# Patient Record
Sex: FEMALE | URBAN - METROPOLITAN AREA
[De-identification: names, ages, dates, MRNs, and addresses within clinical notes are randomized per-mention and may not be internally consistent; named-entity substitution may affect disease eponyms.]

---

## 2022-09-21 ENCOUNTER — EMERGENCY (EMERGENCY)
Facility: HOSPITAL | Age: 52
LOS: 1 days | Discharge: HOME | End: 2022-09-21
Attending: EMERGENCY MEDICINE
Payer: COMMERCIAL

## 2022-09-21 VITALS
RESPIRATION RATE: 16 BRPM | HEART RATE: 81 BPM | HEIGHT: 65 IN | WEIGHT: 169.98 LBS | TEMPERATURE: 98 F | SYSTOLIC BLOOD PRESSURE: 142 MMHG | OXYGEN SATURATION: 97 % | DIASTOLIC BLOOD PRESSURE: 90 MMHG

## 2022-09-21 VITALS
HEART RATE: 85 BPM | OXYGEN SATURATION: 98 % | DIASTOLIC BLOOD PRESSURE: 87 MMHG | TEMPERATURE: 98 F | SYSTOLIC BLOOD PRESSURE: 133 MMHG | RESPIRATION RATE: 18 BRPM

## 2022-09-21 DIAGNOSIS — H72.90 UNSPECIFIED PERFORATION OF TYMPANIC MEMBRANE, UNSPECIFIED EAR: ICD-10-CM

## 2022-09-21 LAB
ALBUMIN SERPL ELPH-MCNC: 4.5 G/DL — SIGNIFICANT CHANGE UP (ref 3.3–5)
ALP SERPL-CCNC: 84 U/L — SIGNIFICANT CHANGE UP (ref 40–120)
ALT FLD-CCNC: 18 U/L — SIGNIFICANT CHANGE UP (ref 10–45)
ANION GAP SERPL CALC-SCNC: 11 MMOL/L — SIGNIFICANT CHANGE UP (ref 5–17)
AST SERPL-CCNC: 13 U/L — SIGNIFICANT CHANGE UP (ref 10–40)
BASOPHILS # BLD AUTO: 0.09 K/UL — SIGNIFICANT CHANGE UP (ref 0–0.2)
BASOPHILS NFR BLD AUTO: 1 % — SIGNIFICANT CHANGE UP (ref 0–2)
BILIRUB SERPL-MCNC: 0.3 MG/DL — SIGNIFICANT CHANGE UP (ref 0.2–1.2)
BUN SERPL-MCNC: 14 MG/DL — SIGNIFICANT CHANGE UP (ref 7–23)
CALCIUM SERPL-MCNC: 10 MG/DL — SIGNIFICANT CHANGE UP (ref 8.4–10.5)
CHLORIDE SERPL-SCNC: 101 MMOL/L — SIGNIFICANT CHANGE UP (ref 96–108)
CO2 SERPL-SCNC: 25 MMOL/L — SIGNIFICANT CHANGE UP (ref 22–31)
CREAT SERPL-MCNC: 0.66 MG/DL — SIGNIFICANT CHANGE UP (ref 0.5–1.3)
EGFR: 105 ML/MIN/1.73M2 — SIGNIFICANT CHANGE UP
EOSINOPHIL # BLD AUTO: 0.58 K/UL — HIGH (ref 0–0.5)
EOSINOPHIL NFR BLD AUTO: 6.4 % — HIGH (ref 0–6)
GLUCOSE SERPL-MCNC: 97 MG/DL — SIGNIFICANT CHANGE UP (ref 70–99)
HCT VFR BLD CALC: 37.3 % — SIGNIFICANT CHANGE UP (ref 34.5–45)
HGB BLD-MCNC: 12.2 G/DL — SIGNIFICANT CHANGE UP (ref 11.5–15.5)
IMM GRANULOCYTES NFR BLD AUTO: 0.3 % — SIGNIFICANT CHANGE UP (ref 0–0.9)
LYMPHOCYTES # BLD AUTO: 3.2 K/UL — SIGNIFICANT CHANGE UP (ref 1–3.3)
LYMPHOCYTES # BLD AUTO: 35.5 % — SIGNIFICANT CHANGE UP (ref 13–44)
MCHC RBC-ENTMCNC: 29.3 PG — SIGNIFICANT CHANGE UP (ref 27–34)
MCHC RBC-ENTMCNC: 32.7 GM/DL — SIGNIFICANT CHANGE UP (ref 32–36)
MCV RBC AUTO: 89.4 FL — SIGNIFICANT CHANGE UP (ref 80–100)
MONOCYTES # BLD AUTO: 0.69 K/UL — SIGNIFICANT CHANGE UP (ref 0–0.9)
MONOCYTES NFR BLD AUTO: 7.7 % — SIGNIFICANT CHANGE UP (ref 2–14)
NEUTROPHILS # BLD AUTO: 4.42 K/UL — SIGNIFICANT CHANGE UP (ref 1.8–7.4)
NEUTROPHILS NFR BLD AUTO: 49.1 % — SIGNIFICANT CHANGE UP (ref 43–77)
NRBC # BLD: 0 /100 WBCS — SIGNIFICANT CHANGE UP (ref 0–0)
PLATELET # BLD AUTO: 326 K/UL — SIGNIFICANT CHANGE UP (ref 150–400)
POTASSIUM SERPL-MCNC: 4.3 MMOL/L — SIGNIFICANT CHANGE UP (ref 3.5–5.3)
POTASSIUM SERPL-SCNC: 4.3 MMOL/L — SIGNIFICANT CHANGE UP (ref 3.5–5.3)
PROT SERPL-MCNC: 8.3 G/DL — SIGNIFICANT CHANGE UP (ref 6–8.3)
RBC # BLD: 4.17 M/UL — SIGNIFICANT CHANGE UP (ref 3.8–5.2)
RBC # FLD: 12.9 % — SIGNIFICANT CHANGE UP (ref 10.3–14.5)
SODIUM SERPL-SCNC: 137 MMOL/L — SIGNIFICANT CHANGE UP (ref 135–145)
WBC # BLD: 9.01 K/UL — SIGNIFICANT CHANGE UP (ref 3.8–10.5)
WBC # FLD AUTO: 9.01 K/UL — SIGNIFICANT CHANGE UP (ref 3.8–10.5)

## 2022-09-21 PROCEDURE — 99283 EMERGENCY DEPT VISIT LOW MDM: CPT

## 2022-09-21 PROCEDURE — G1004: CPT

## 2022-09-21 PROCEDURE — 70481 CT ORBIT/EAR/FOSSA W/DYE: CPT | Mod: 26,MG

## 2022-09-21 PROCEDURE — 99285 EMERGENCY DEPT VISIT HI MDM: CPT

## 2022-09-21 RX ORDER — OFLOXACIN OTIC SOLUTION 3 MG/ML
10 SOLUTION/ DROPS AURICULAR (OTIC)
Refills: 0 | Status: DISCONTINUED | OUTPATIENT
Start: 2022-09-21 | End: 2022-09-25

## 2022-09-21 RX ORDER — SODIUM CHLORIDE 9 MG/ML
1000 INJECTION INTRAMUSCULAR; INTRAVENOUS; SUBCUTANEOUS ONCE
Refills: 0 | Status: COMPLETED | OUTPATIENT
Start: 2022-09-21 | End: 2022-09-21

## 2022-09-21 RX ORDER — MECLIZINE HCL 12.5 MG
25 TABLET ORAL ONCE
Refills: 0 | Status: COMPLETED | OUTPATIENT
Start: 2022-09-21 | End: 2022-09-21

## 2022-09-21 RX ORDER — KETOROLAC TROMETHAMINE 30 MG/ML
15 SYRINGE (ML) INJECTION ONCE
Refills: 0 | Status: DISCONTINUED | OUTPATIENT
Start: 2022-09-21 | End: 2022-09-21

## 2022-09-21 RX ADMIN — Medication 15 MILLIGRAM(S): at 19:45

## 2022-09-21 RX ADMIN — SODIUM CHLORIDE 1000 MILLILITER(S): 9 INJECTION INTRAMUSCULAR; INTRAVENOUS; SUBCUTANEOUS at 19:45

## 2022-09-21 RX ADMIN — Medication 25 MILLIGRAM(S): at 19:45

## 2022-09-21 RX ADMIN — OFLOXACIN OTIC SOLUTION 10 DROP(S): 3 SOLUTION/ DROPS AURICULAR (OTIC) at 23:02

## 2022-09-21 NOTE — ED PROVIDER NOTE - RIGHT EAR
+TM preformation w/ blood in external auditory canal. L TM intact and clear. No mastoid swelling, erythema or tenderness b/l./TM PERFORATIONS

## 2022-09-21 NOTE — CONSULT NOTE ADULT - ASSESSMENT
51 yo female c/o decreased hearing in R ear, HA and dizziness after using Qtip to clean ears yesterdays. States attempted to use Q-tip in R ear yesterday and felt pain and noted blood from the ear afterwards. CT unremarkable. Small perforation noted on exam/ trauma to the ear

## 2022-09-21 NOTE — ED ADULT NURSE NOTE - OBJECTIVE STATEMENT
Female 52 years old alert and orientedx4 came in for ear pain.  states that patient was cleaning her right ear with Q tip yesterday and accidentally push the Q tip inside. Reports dizziness, headache and bleeding. Denies fever, chills, nausea or vomiting. Will monitor.

## 2022-09-21 NOTE — ED PROVIDER NOTE - PATIENT PORTAL LINK FT
You can access the FollowMyHealth Patient Portal offered by [facility] by registering at the following website: [email or web address]. By joining [facility]’s FollowMyHealth portal, you will also be able to view your health information using other applications (apps) compatible with our system.

## 2022-09-21 NOTE — ED ADULT NURSE REASSESSMENT NOTE - NS ED NURSE REASSESS COMMENT FT1
pt refused covid swab at this time, pt made aware that should she be admitted she will need to have the covid swab done, she understands and states if she gets admitted she will allow the swab

## 2022-09-21 NOTE — ED PROVIDER NOTE - CARE PROVIDER_API CALL
Honey Braden)  Otolaryngology  [address] [place] [address], [address]  [place] [address]  Phone: [phone]  Fax: [phone]  Follow Up Time: 7-10 Days

## 2022-09-21 NOTE — ED PROVIDER NOTE - OBJECTIVE STATEMENT
53 yo female pmhx HTN, HLD presents to the ED c/o decreased hearing in R ear, HA and dizziness after using Qtip to clean ears yesterdays. States attempted to use Q-tip in R ear yesterday and felt pain and noted blood from the ear afterwards. Q-tip was intact upon removal. Since then has had less hearing in R ear, gradually developing HA and unsteadiness. 51 yo female pmhx HTN, HLD presents to the ED c/o decreased hearing in R ear, HA and dizziness after using Qtip to clean ears yesterdays. States attempted to use Q-tip in R ear yesterday and felt pain and noted blood from the ear afterwards. Q-tip was intact upon removal. Since then has had less hearing in R ear, gradually developing HA and unsteadiness. Still noticing blood in her R ear. RUSHING frontal, feels her balance is off when walking. Endorses subjective chills last night and this AM. Denies room spinning dizziness, n/v/d, speech/visual changes, numbness/tingling, weakness, fever, pain behind the ear.

## 2022-09-21 NOTE — ED PROVIDER NOTE - PROGRESS NOTE DETAILS
ENT evaluated pt, recommended floxin otic drops BID x 7 days and outpt f/u with Dr. Braden. Attending aware, cleared for discharge home. - JOSÉ MIGUEL NietoC

## 2022-09-21 NOTE — ED PROVIDER NOTE - CLINICAL SUMMARY MEDICAL DECISION MAKING FREE TEXT BOX
52-year-old woman with a history of hypertension dyslipidemia with 2-day history of right ear pain and new headache/dizziness after inserting a Q-tip deep into her ear yesterday.  States had immediate pain after Q-tip was inserted.  No discharge from the ear.  No vomiting or nausea.  Low-grade fevers (99.5) earlier today.  Patient is not immunosuppressed.  Decreased hearing from the right ear.  Exam shows large perforation to right TM with out obvious discharge or purulence within the external auditory canal.  Inner ear is significantly erythematous.  There is no clinical evidence of mastoiditis.  No cranial nerve deficits.  No clinical evidence of meningitis or encephalitis.  Concern for inner ear infection in the context of large TM perforation.  No evidence of retained foreign body on examination and patient states that Q-tip was intact when she withdrew it.  Will check CT IAC, consult ENT, check labs, give pain medications, reassess.  --FABI

## 2022-09-21 NOTE — ED ADULT NURSE REASSESSMENT NOTE - NS ED NURSE REASSESS COMMENT FT1
report received from Jj RN, pt A&Ox4, pt in no acute distress at this time, pt safety and comfort provided.

## 2022-09-21 NOTE — CONSULT NOTE ADULT - SUBJECTIVE AND OBJECTIVE BOX
CC: ear pain    HPI: 53 yo female pmhx HTN, HLD presents to the ED c/o decreased hearing in R ear, HA and dizziness after using Qtip to clean ears yesterdays. States attempted to use Q-tip in R ear yesterday and felt pain and noted blood from the ear afterwards. Q-tip was intact upon removal. Since then has had less hearing in R ear, gradually developing HA and unsteadiness. Still noticing blood in her R ear. RUSHING frontal, feels her balance is off when walking. Endorses subjective chills last night and this AM. Denies room spinning dizziness, n/v/d, speech/visual changes, numbness/tingling, weakness, fever, pain behind the ear.        PAST MEDICAL & SURGICAL HISTORY:    Allergies    latex (Short breath; Rash)  No Known Drug Allergies    Intolerances      MEDICATIONS  (STANDING):    MEDICATIONS  (PRN):      Social History: nonsmoker     Family history: no pertinent family history    ROS:   ENT: all negative except as noted in HPI   CV: denies palpitations  Pulm: denies SOB, cough, hemoptysis  GI: denies change in apetite, indigestion, n/v  : denies pertinent urinary symptoms, urgency  Neuro: denies numbness/tingling, loss of sensation  Psych: denies anxiety  MS: denies muscle weakness, instability  Heme: denies easy bruising or bleeding  Endo: denies heat/cold intolerance, excessive sweating  Vascular: denies LE edema    Vital Signs Last 24 Hrs  T(C): 36.8 (21 Sep 2022 19:30), Max: 36.9 (21 Sep 2022 17:13)  T(F): 98.3 (21 Sep 2022 19:30), Max: 98.5 (21 Sep 2022 17:13)  HR: 85 (21 Sep 2022 19:30) (81 - 85)  BP: 133/87 (21 Sep 2022 19:30) (133/87 - 142/90)  BP(mean): --  RR: 18 (21 Sep 2022 19:30) (16 - 18)  SpO2: 98% (21 Sep 2022 19:30) (97% - 98%)    Parameters below as of 21 Sep 2022 19:30  Patient On (Oxygen Delivery Method): room air                              12.2   9.01  )-----------( 326      ( 21 Sep 2022 19:01 )             37.3    09-21    137  |  101  |  14  ----------------------------<  97  4.3   |  25  |  0.66    Ca    10.0      21 Sep 2022 19:01    TPro  8.3  /  Alb  4.5  /  TBili  0.3  /  DBili  x   /  AST  13  /  ALT  18  /  AlkPhos  84  09-21       PHYSICAL EXAM:  Gen: NAD  Skin: No rashes, bruises, or lesions  Head: Normocephalic, Atraumatic  Face: no edema, erythema, or fluctuance. Parotid glands soft without mass  Eyes: no scleral injection  Ears: Right - ear canal clear, trauma to the ear with minimal blood but not actively bleeding, small perforation at the 8 o'clock position. No purulent drainage. No mastoid tenderness, erythema, or ear bulging            Left - ear canal clear, TM intact without effusion or erythema. No evidence of any fluid drainage. No mastoid tenderness, erythema, or ear bulging  Nose: Nares bilaterally patent, no discharge  Mouth: No Stridor / Drooling / Trismus.  Mucosa moist, tongue/uvula midline, oropharynx clear  Neck: Flat, supple, no lymphadenopathy, trachea midline, no masses  Lymphatic: No lymphadenopathy  Resp: breathing easily, no stridor  CV: no peripheral edema/cyanosis  GI: nondistended   Peripheral vascular: no JVD or edema  Neuro: facial nerve intact, no facial droop        IMAGING/ADDITIONAL STUDIES:     ACC: [id number] EXAM: CT IAC IC    PROCEDURE DATE: 09/21/2022        INTERPRETATION: CT INTERNAL AUDITORY CANALS WITH INTRAVENOUS CONTRAST.    CLINICAL INFORMATION: Clinical Information    COMPARISON: None available.    TECHNIQUE: After the administration of intravenous contrast, high resolution CT imaging of the temporal bones was performed. Sagittal and coronal reformatted images were obtained from the source data and also reviewed.    Contrast: 90 cc Omnipaque 350 was administered.    FINDINGS:    On the right, the external auditory canal is normal appearance. The tympanic membrane is not thickened. The middle ear cavity is well-aerated. The ossicular chain is intact. The otic capsule appears within normal limits. The facial nerve has a normal course. The inner ear structures are normally formed. The internal auditory canal is normal in size. Mastoid air cells are well-aerated. No bony erosion or destruction is seen.    On the left, the external auditory canal is normal appearance. The tympanic membrane is not thickened. The middle ear cavity is well-aerated. The ossicular chain is intact. The otic capsule appears within normal limits. The facial nerve has a normal course. The inner ear structures are normally formed. The internal auditory canal is normal in size. Mastoid air cells are well-aerated. No bony erosion or destruction is seen.    No discrete fluid collections. No abnormal enhancement.    The visualized portion of the brain is unremarkable.    IMPRESSION:  Unremarkable examination of the internal auditory canals. Direct visual inspection may be considered for further evaluation.    --- End of Report ---            GURJIT GREEN MD; Attending Radiologist  This document has been electronically signed. Sep 21 2022 8:01PM

## 2022-09-21 NOTE — ED PROVIDER NOTE - NSFOLLOWUPINSTRUCTIONS_ED_ALL_ED_FT
52-year-old woman with a history of hypertension dyslipidemia with 2-day history of right ear pain and new headache/dizziness after inserting a Q-tip deep into her ear yesterday.  States had immediate pain after Q-tip was inserted.  No discharge from the ear.  No vomiting or nausea.  Low-grade fevers (99.5) earlier today.  Patient is not immunosuppressed.  Decreased hearing from the right ear.  Exam shows large perforation to right TM with out obvious discharge or purulence within the external auditory canal.  Inner ear is significantly erythematous.  There is no clinical evidence of mastoiditis.  No cranial nerve deficits.  No clinical evidence of meningitis or encephalitis.  Concern for inner ear infection in the context of large TM perforation.  No evidence of retained foreign body on examination and patient states that Q-tip was intact when she withdrew it.  Will check CT IAC, consult ENT, check labs, give pain medications, reassess.  --FABI Follow up with Ear-Nose-Throat Physician Dr. Braden within the next 1 week for re-evaluation.    Start Floxin Otic Ear Drops 10 drops to right ear twice daily for 7 days. Follow up with Ear-Nose-Throat Physician Dr. Braden within the next 1 week for re-evaluation.    Start Floxin Otic Ear Drops 10 drops to right ear twice daily for 7 days.    Keep your ear canal dry!  Do not allow water to enter your ear canal when showering/bathing until you follow up with Dr. Braden.    Information regarding your ruptured eardrum was included in your discharge paperwork    --------------------------------------------------------------------------------------------------------------------------    Darrick un seguimiento con el Dr. Braden, médico especialista en oídos, nariz y garganta, dentro de la próxima semana para simone nueva evaluación.    Comience con Floxin Otic Ear Drops 10 gotas en el oído derecho dos veces al día brionna 7 días.  ¡Mantenga koo canal auditivo seco! No permita que entre agua en koo canal auditivo cuando se duche/bañe hasta que darrick un seguimiento con el Dr. Braden.    La información sobre koo tímpano roto se incluyó en koo documentación de genoveva. Follow up with Ear-Nose-Throat Physician Dr. Braden within the next 1 week for re-evaluation.    Start Floxin Otic Ear Drops 10 drops to right ear twice daily for 7 days.    Keep your ear canal dry! Do not allow water to enter your ear canal when showering/bathing (do not submerge head under water) until you follow up with Dr. Braden.    Take Tylenol 650 mg every 6 hours as needed for pain.     Information regarding your ruptured eardrum was included in your discharge paperwork    Return to the Emergency Department immediately if you develop any new/worsening symptoms including fever/chills, worsening headache, numbness/tingling or any other concerning symptoms.

## 2022-09-21 NOTE — CONSULT NOTE ADULT - PROBLEM SELECTOR RECOMMENDATION 9
Floxin drops to the right ear   Avoid getting water in the ear, use a cotton ball when showering  Follow up as an outpatient